# Patient Record
Sex: FEMALE
[De-identification: names, ages, dates, MRNs, and addresses within clinical notes are randomized per-mention and may not be internally consistent; named-entity substitution may affect disease eponyms.]

---

## 2020-04-07 ENCOUNTER — NURSE TRIAGE (OUTPATIENT)
Dept: OTHER | Facility: CLINIC | Age: 25
End: 2020-04-07

## 2020-04-07 NOTE — TELEPHONE ENCOUNTER
Reason for Disposition   MODERATE rectal bleeding (small blood clots, passing blood without stool, or toilet water turns red)    Answer Assessment - Initial Assessment Questions  1. APPEARANCE of BLOOD: \"What color is it? \" \"Is it passed separately, on the surface of the stool, or mixed in with the stool? \"       Red with mucus  2. AMOUNT: \"How much blood was passed? \"       Moderate amount  3. FREQUENCY: \"How many times has blood been passed with the stools? \"       once  4. ONSET: \"When was the blood first seen in the stools? \" (Days or weeks)       today  5. DIARRHEA: \"Is there also some diarrhea? \" If so, ask: \"How many diarrhea stools were passed in past 24 hours? \"       Yes one time before  6. CONSTIPATION: \"Do you have constipation? \" If so, \"How bad is it? \"      A little constipated lately  7. RECURRENT SYMPTOMS: \"Have you had blood in your stools before? \" If so, ask: \"When was the last time? \" and \"What happened that time? \"       Several years ago anal fissure   8. BLOOD THINNERS: \"Do you take any blood thinners? \" (e.g., Coumadin/warfarin, Pradaxa/dabigatran, aspirin)      no  9. OTHER SYMPTOMS: \"Do you have any other symptoms? \"  (e.g., abdominal pain, vomiting, dizziness, fever)      Abdominal cramping, nausea  10. PREGNANCY: \"Is there any chance you are pregnant? \" \"When was your last menstrual period? \"        yes    Protocols used: RECTAL BLEEDING-ADULT-AH